# Patient Record
Sex: FEMALE | Race: WHITE | Employment: FULL TIME | URBAN - METROPOLITAN AREA
[De-identification: names, ages, dates, MRNs, and addresses within clinical notes are randomized per-mention and may not be internally consistent; named-entity substitution may affect disease eponyms.]

---

## 2019-06-01 ENCOUNTER — HOSPITAL ENCOUNTER (EMERGENCY)
Age: 59
Discharge: HOME | End: 2019-06-01
Attending: EMERGENCY MEDICINE
Payer: COMMERCIAL

## 2019-06-01 VITALS
SYSTOLIC BLOOD PRESSURE: 124 MMHG | RESPIRATION RATE: 16 BRPM | HEART RATE: 72 BPM | DIASTOLIC BLOOD PRESSURE: 70 MMHG | TEMPERATURE: 98.1 F | HEIGHT: 65 IN | BODY MASS INDEX: 28.32 KG/M2 | OXYGEN SATURATION: 96 % | WEIGHT: 170 LBS

## 2019-06-01 DIAGNOSIS — R20.2 NUMBNESS AND TINGLING OF LEFT LEG: Primary | ICD-10-CM

## 2019-06-01 DIAGNOSIS — R20.0 NUMBNESS AND TINGLING OF LEFT LEG: Primary | ICD-10-CM

## 2019-06-01 PROCEDURE — 99283 EMERGENCY DEPT VISIT LOW MDM: CPT

## 2019-06-01 NOTE — ED TRIAGE NOTES
Complains of left lower leg numbness and tingling upon waking up this morning. Denies injury or pain. Denies extremity weakness, no slurred speech. States missed work today.

## 2019-06-01 NOTE — ED PROVIDER NOTES
EMERGENCY DEPARTMENT HISTORY AND PHYSICAL EXAM    Date: 6/1/2019  Patient Name: Riddhi Ruvalcaba    History of Presenting Illness     Chief Complaint   Patient presents with    Numbness    Letter for School/Work     requesting work note. History Provided By: Patient    Chief Complaint: numbness  Duration: onset this am when she got out of bed   Timing:  Acute  Location: left foot and left lower leg  Quality: tingling prickling  Severity: Moderate  Modifying Factors: numbness causes difficulty with walking  Associated Symptoms: none      HPI: Riddhi Ruvalcaba is a 66 y.o. female with a PMH of No significant past medical history who presents with numbness to left left foot and left leg acute onset this morning when she got out of bed. States she stated she thinks she slept on the leg which caused the numbness and waited a few hours but numbness persisted. Denies numbness or tingling of any other extremities denies facial numbness denies difficulty speaking gait imbalance visual disturbance headache. He has no other complaints at this time. PCP: None    Current Outpatient Medications   Medication Sig Dispense Refill    DULoxetine (CYMBALTA) 60 mg capsule Take 60 mg by mouth daily.  DULoxetine (CYMBALTA) 30 mg capsule Take 30 mg by mouth nightly.  RISPERIDONE (RISPERDAL PO) Take 1 mg by mouth.  ALPRAZolam (XANAX) 0.25 mg tablet Take  by mouth nightly as needed for Anxiety. Past History     Past Medical History:  History reviewed. No pertinent past medical history. Past Surgical History:  Past Surgical History:   Procedure Laterality Date    HX ORTHOPAEDIC      Back surgery       Family History:  History reviewed. No pertinent family history. Social History:  Social History     Tobacco Use    Smoking status: Current Every Day Smoker    Smokeless tobacco: Current User   Substance Use Topics    Alcohol use: Yes     Comment: occ    Drug use: No       Allergies:   Allergies Allergen Reactions    Aspirin Hives         Review of Systems   Review of Systems   Constitutional: Negative for fatigue and fever. Respiratory: Negative for shortness of breath and wheezing. Cardiovascular: Negative for chest pain and palpitations. Gastrointestinal: Negative for abdominal pain. Musculoskeletal: Negative for arthralgias, myalgias, neck pain and neck stiffness. Skin: Negative for pallor and rash. Neurological: Positive for numbness. Negative for dizziness, tremors, weakness and headaches. Hematological: Negative for adenopathy. All other systems reviewed and are negative. Physical Exam     Vitals:    06/01/19 1213   BP: 124/70   Pulse: 72   Resp: 16   Temp: 98.1 °F (36.7 °C)   SpO2: 96%   Weight: 77.1 kg (170 lb)   Height: 5' 5\" (1.651 m)     Physical Exam   Constitutional: She is oriented to person, place, and time. She appears well-developed and well-nourished. No distress. HENT:   Head: Normocephalic and atraumatic. Right Ear: External ear normal.   Left Ear: External ear normal.   Nose: Nose normal.   Eyes: Conjunctivae are normal.   Neck: Normal range of motion. Neck supple. Cardiovascular: Normal rate, regular rhythm and normal heart sounds. Pulmonary/Chest: Effort normal and breath sounds normal. No respiratory distress. She has no wheezes. Abdominal: Soft. Bowel sounds are normal. There is no tenderness. Musculoskeletal: Normal range of motion. Patient reports positive sensation to light touch of lower leg and foot. Full range of motion of left lower leg and foot distal neurovascular status intact. Lymphadenopathy:     She has no cervical adenopathy. Neurological: She is alert and oriented to person, place, and time. No cranial nerve deficit. Coordination normal.   Skin: Skin is warm and dry. No rash noted. Psychiatric: She has a normal mood and affect.  Her behavior is normal. Judgment and thought content normal.   Nursing note and vitals reviewed. Diagnostic Study Results     Labs -   No results found for this or any previous visit (from the past 12 hour(s)). Radiologic Studies -   No orders to display     CT Results  (Last 48 hours)    None        CXR Results  (Last 48 hours)    None            Medical Decision Making   I am the first provider for this patient. I reviewed the vital signs, available nursing notes, past medical history, past surgical history, family history and social history. Vital Signs-Reviewed the patient's vital signs. Records Reviewed: Nursing Notes            Disposition:  home  Warm packs applied with relief patient reports relief from numbness ambulatory with steady gait  DISCHARGE NOTE:         Care plan outlined and precautions discussed. Patient has no new complaints, changes, or physical findings. . All medications were reviewed with the patient; will d/c home warm compresses to area. All of pt's questions and concerns were addressed. Patient was instructed and agrees to follow up with PCP, as well as to return to the ED upon further deterioration. Patient is ready to go home. Follow-up Information     Follow up With Specialties Details Why [address] Internal Medicine In 2 days  [address]  [phone]          Discharge Medication List as of 6/1/2019  1:05 PM          Provider Notes (Medical Decision Making):   DDX paresthesia nerve impingement referral neuropathy  Procedures:  Procedures        Diagnosis     Clinical Impression:   1.  Numbness and tingling of left leg

## 2019-06-01 NOTE — DISCHARGE INSTRUCTIONS
Patient Education        Numbness and Tingling: Care Instructions  Your Care Instructions    Many things can cause numbness or tingling. Swelling may put pressure on a nerve. This could cause you to lose feeling or have a pins-and-needles sensation on part of your body. Nerves may be damaged from trauma, toxins, or diseases, such as diabetes or multiple sclerosis (MS). Sometimes, though, the cause is not clear. If there is no clear reason for your symptoms, and you are not having any other symptoms, your doctor may suggest watching and waiting for a while to see if the numbness or tingling goes away on its own. Your doctor may want you to have blood or nerve tests to find the cause of your symptoms. Follow-up care is a key part of your treatment and safety. Be sure to make and go to all appointments, and call your doctor if you are having problems. It's also a good idea to know your test results and keep a list of the medicines you take. How can you care for yourself at home? · If your doctor prescribes medicine, take it exactly as directed. Call your doctor if you think you are having a problem with your medicine. · If you have any swelling, put ice or a cold pack on the area for 10 to 20 minutes at a time. Put a thin cloth between the ice and your skin. When should you call for help? Call 911 anytime you think you may need emergency care. For example, call if:    · You have weakness, numbness, or tingling in both legs.     · You lose bowel or bladder control.     · You have symptoms of a stroke. These may include:  ? Sudden numbness, tingling, weakness, or loss of movement in your face, arm, or leg, especially on only one side of your body. ? Sudden vision changes. ? Sudden trouble speaking. ? Sudden confusion or trouble understanding simple statements. ? Sudden problems with walking or balance.   ? A sudden, severe headache that is different from past headaches.    Watch closely for changes in your health, and be sure to contact your doctor if you have any problems, or if:    · You do not get better as expected. Where can you learn more? Go to http://kelli-lorenza.info/. Enter J562 in the search box to learn more about \"Numbness and Tingling: Care Instructions. \"  Current as of: Ruthy 3, 2018  Content Version: 11.9  © 8649-2684 [employer]. Care instructions adapted under license by [employer] (which disclaims liability or warranty for this information). If you have questions about a medical condition or this instruction, always ask your healthcare professional. [address] any warranty or liability for your use of this information.

## 2019-06-01 NOTE — ED NOTES
Pt ambulatory in ER with c/o lt leg numb when she woke up in the morning,denies any injury,till feeling numb,Pt walked to room with steady gait. Emergency Department Nursing Plan of Care       The Nursing Plan of Care is developed from the Nursing assessment and Emergency Department Attending provider initial evaluation. The plan of care may be reviewed in the ED Provider note.     The Plan of Care was developed with the following considerations:   Patient / Family readiness to learn indicated by:verbalized understanding  Persons(s) to be included in education: patient  Barriers to Learning/Limitations:No    Signed     Curt Bentley RN    6/1/2019   12:38 PM

## 2019-06-01 NOTE — LETTER
[facility] EMERGENCY DEPT 
[address] [facility] [place] [facility] [id number] 
[phone] Work/School Note Date: 6/1/2019 To Whom It May concern: 
 
Loy Pizarro was seen and treated today in the emergency room by the following provider(s): 
Attending Provider: Kostas Frazier MD 
Nurse Practitioner: Richmond Priest NP. Loy Pizarro may return to work on Ruthy 3. Sincerely, Charanjit Singleton NP